# Patient Record
Sex: MALE | Race: WHITE | HISPANIC OR LATINO | Employment: UNEMPLOYED | URBAN - METROPOLITAN AREA
[De-identification: names, ages, dates, MRNs, and addresses within clinical notes are randomized per-mention and may not be internally consistent; named-entity substitution may affect disease eponyms.]

---

## 2019-07-15 PROBLEM — Z86.0100 HISTORY OF COLON POLYPS: Status: ACTIVE | Noted: 2019-07-15

## 2019-07-15 PROBLEM — Z86.010 HISTORY OF COLON POLYPS: Status: ACTIVE | Noted: 2019-07-15

## 2024-01-21 ENCOUNTER — APPOINTMENT (OUTPATIENT)
Dept: GENERAL RADIOLOGY | Facility: HOSPITAL | Age: 59
End: 2024-01-21
Payer: COMMERCIAL

## 2024-01-21 ENCOUNTER — HOSPITAL ENCOUNTER (EMERGENCY)
Facility: HOSPITAL | Age: 59
Discharge: HOME | End: 2024-01-21
Attending: EMERGENCY MEDICINE | Admitting: EMERGENCY MEDICINE
Payer: COMMERCIAL

## 2024-01-21 VITALS
DIASTOLIC BLOOD PRESSURE: 109 MMHG | TEMPERATURE: 98.5 F | SYSTOLIC BLOOD PRESSURE: 144 MMHG | RESPIRATION RATE: 16 BRPM | OXYGEN SATURATION: 99 % | HEART RATE: 82 BPM

## 2024-01-21 DIAGNOSIS — M77.12 LEFT LATERAL EPICONDYLITIS: ICD-10-CM

## 2024-01-21 DIAGNOSIS — M77.11 RIGHT LATERAL EPICONDYLITIS: Primary | ICD-10-CM

## 2024-01-21 LAB
ALBUMIN SERPL-MCNC: 3.5 G/DL (ref 3.5–5.2)
ALBUMIN/GLOB SERPL: 1.1 G/DL
ALP SERPL-CCNC: 94 U/L (ref 39–117)
ALT SERPL W P-5'-P-CCNC: 36 U/L (ref 1–41)
ANION GAP SERPL CALCULATED.3IONS-SCNC: 10.2 MMOL/L (ref 5–15)
AST SERPL-CCNC: 24 U/L (ref 1–40)
BASOPHILS # BLD AUTO: 0.02 10*3/MM3 (ref 0–0.2)
BASOPHILS NFR BLD AUTO: 0.3 % (ref 0–1.5)
BILIRUB SERPL-MCNC: 0.4 MG/DL (ref 0–1.2)
BUN SERPL-MCNC: 28 MG/DL (ref 6–20)
BUN/CREAT SERPL: 14.7 (ref 7–25)
CALCIUM SPEC-SCNC: 9 MG/DL (ref 8.6–10.5)
CHLORIDE SERPL-SCNC: 101 MMOL/L (ref 98–107)
CO2 SERPL-SCNC: 23.8 MMOL/L (ref 22–29)
CREAT SERPL-MCNC: 1.91 MG/DL (ref 0.76–1.27)
DEPRECATED RDW RBC AUTO: 41.1 FL (ref 37–54)
EGFRCR SERPLBLD CKD-EPI 2021: 39.9 ML/MIN/1.73
EOSINOPHIL # BLD AUTO: 0.06 10*3/MM3 (ref 0–0.4)
EOSINOPHIL NFR BLD AUTO: 0.8 % (ref 0.3–6.2)
ERYTHROCYTE [DISTWIDTH] IN BLOOD BY AUTOMATED COUNT: 13.3 % (ref 12.3–15.4)
GLOBULIN UR ELPH-MCNC: 3.3 GM/DL
GLUCOSE SERPL-MCNC: 108 MG/DL (ref 65–99)
HCT VFR BLD AUTO: 38 % (ref 37.5–51)
HGB BLD-MCNC: 12.5 G/DL (ref 13–17.7)
IMM GRANULOCYTES # BLD AUTO: 0.02 10*3/MM3 (ref 0–0.05)
IMM GRANULOCYTES NFR BLD AUTO: 0.3 % (ref 0–0.5)
LYMPHOCYTES # BLD AUTO: 1.6 10*3/MM3 (ref 0.7–3.1)
LYMPHOCYTES NFR BLD AUTO: 22.7 % (ref 19.6–45.3)
MCH RBC QN AUTO: 28.2 PG (ref 26.6–33)
MCHC RBC AUTO-ENTMCNC: 32.9 G/DL (ref 31.5–35.7)
MCV RBC AUTO: 85.6 FL (ref 79–97)
MONOCYTES # BLD AUTO: 0.58 10*3/MM3 (ref 0.1–0.9)
MONOCYTES NFR BLD AUTO: 8.2 % (ref 5–12)
NEUTROPHILS NFR BLD AUTO: 4.78 10*3/MM3 (ref 1.7–7)
NEUTROPHILS NFR BLD AUTO: 67.7 % (ref 42.7–76)
NRBC BLD AUTO-RTO: 0 /100 WBC (ref 0–0.2)
PLATELET # BLD AUTO: 163 10*3/MM3 (ref 140–450)
PMV BLD AUTO: 10.5 FL (ref 6–12)
POTASSIUM SERPL-SCNC: 4.2 MMOL/L (ref 3.5–5.2)
PROT SERPL-MCNC: 6.8 G/DL (ref 6–8.5)
RBC # BLD AUTO: 4.44 10*6/MM3 (ref 4.14–5.8)
SODIUM SERPL-SCNC: 135 MMOL/L (ref 136–145)
WBC NRBC COR # BLD AUTO: 7.06 10*3/MM3 (ref 3.4–10.8)

## 2024-01-21 PROCEDURE — 36415 COLL VENOUS BLD VENIPUNCTURE: CPT

## 2024-01-21 PROCEDURE — 85025 COMPLETE CBC W/AUTO DIFF WBC: CPT | Performed by: EMERGENCY MEDICINE

## 2024-01-21 PROCEDURE — 80053 COMPREHEN METABOLIC PANEL: CPT | Performed by: EMERGENCY MEDICINE

## 2024-01-21 PROCEDURE — 73090 X-RAY EXAM OF FOREARM: CPT

## 2024-01-21 PROCEDURE — 73070 X-RAY EXAM OF ELBOW: CPT

## 2024-01-21 PROCEDURE — 99283 EMERGENCY DEPT VISIT LOW MDM: CPT

## 2024-01-21 RX ORDER — PREDNISONE 20 MG/1
40 TABLET ORAL DAILY
Qty: 10 TABLET | Refills: 0 | Status: SHIPPED | OUTPATIENT
Start: 2024-01-21

## 2024-01-21 RX ORDER — PREDNISONE 20 MG/1
40 TABLET ORAL DAILY
Qty: 10 TABLET | Refills: 0 | Status: SHIPPED | OUTPATIENT
Start: 2024-01-21 | End: 2024-01-21 | Stop reason: SDUPTHER

## 2024-01-21 RX ORDER — HYDROCODONE BITARTRATE AND ACETAMINOPHEN 5; 325 MG/1; MG/1
1 TABLET ORAL EVERY 6 HOURS PRN
Qty: 5 TABLET | Refills: 0 | Status: SHIPPED | OUTPATIENT
Start: 2024-01-21

## 2024-01-21 NOTE — ED PROVIDER NOTES
EMERGENCY DEPARTMENT ENCOUNTER    Room Number:  10/10  PCP: Eric Eaton MD  Historian: Patient      HPI:  Chief Complaint: Right elbow pain  A complete HPI/ROS/PMH/PSH/SH/FH are unobtainable due to: Language barrier history obtained through phone   Context: Ankit Shore is a 59 y.o. male who presents to the ED c/o elbow pain.  Patient has history of gout as well as kidney issues.  Patient is a  for living.  Patient states he had increasing pain in his right elbow.  Pain worse with movement.  Worse at night and better during the day.  Point tender to his lateral elbow.  Patient states he is starting to get some pain in the left elbow as well.  Has had no fevers or chills.  Patient has no pain in his neck            PAST MEDICAL HISTORY  Active Ambulatory Problems     Diagnosis Date Noted    No Active Ambulatory Problems     Resolved Ambulatory Problems     Diagnosis Date Noted    No Resolved Ambulatory Problems     No Additional Past Medical History         PAST SURGICAL HISTORY  No past surgical history on file.      FAMILY HISTORY  No family history on file.      SOCIAL HISTORY  Social History     Socioeconomic History    Marital status:          ALLERGIES  Patient has no known allergies.        REVIEW OF SYSTEMS  Review of Systems   Right elbow pain      PHYSICAL EXAM  ED Triage Vitals   Temp Heart Rate Resp BP SpO2   01/21/24 1008 01/21/24 1008 01/21/24 1008 01/21/24 1018 01/21/24 1008   98.5 °F (36.9 °C) 82 16 149/86 99 %      Temp src Heart Rate Source Patient Position BP Location FiO2 (%)   01/21/24 1008 01/21/24 1008 -- -- --   Tympanic Monitor          Physical Exam      GENERAL: no acute distress  HENT: nares patent  EYES: no scleral icterus  CV: regular rhythm, normal rate  RESPIRATORY: normal effort  ABDOMEN: soft  MUSCULOSKELETAL: no deformity.  Tenderness to lateral epicondyle both sides  NEURO: alert, moves all extremities, follows commands  PSYCH:  calm,  cooperative  SKIN: warm, dry    Vital signs and nursing notes reviewed.          LAB RESULTS  Recent Results (from the past 24 hour(s))   Comprehensive Metabolic Panel    Collection Time: 01/21/24 10:47 AM    Specimen: Arm, Left; Blood   Result Value Ref Range    Glucose 108 (H) 65 - 99 mg/dL    BUN 28 (H) 6 - 20 mg/dL    Creatinine 1.91 (H) 0.76 - 1.27 mg/dL    Sodium 135 (L) 136 - 145 mmol/L    Potassium 4.2 3.5 - 5.2 mmol/L    Chloride 101 98 - 107 mmol/L    CO2 23.8 22.0 - 29.0 mmol/L    Calcium 9.0 8.6 - 10.5 mg/dL    Total Protein 6.8 6.0 - 8.5 g/dL    Albumin 3.5 3.5 - 5.2 g/dL    ALT (SGPT) 36 1 - 41 U/L    AST (SGOT) 24 1 - 40 U/L    Alkaline Phosphatase 94 39 - 117 U/L    Total Bilirubin 0.4 0.0 - 1.2 mg/dL    Globulin 3.3 gm/dL    A/G Ratio 1.1 g/dL    BUN/Creatinine Ratio 14.7 7.0 - 25.0    Anion Gap 10.2 5.0 - 15.0 mmol/L    eGFR 39.9 (L) >60.0 mL/min/1.73   CBC Auto Differential    Collection Time: 01/21/24 10:47 AM    Specimen: Arm, Left; Blood   Result Value Ref Range    WBC 7.06 3.40 - 10.80 10*3/mm3    RBC 4.44 4.14 - 5.80 10*6/mm3    Hemoglobin 12.5 (L) 13.0 - 17.7 g/dL    Hematocrit 38.0 37.5 - 51.0 %    MCV 85.6 79.0 - 97.0 fL    MCH 28.2 26.6 - 33.0 pg    MCHC 32.9 31.5 - 35.7 g/dL    RDW 13.3 12.3 - 15.4 %    RDW-SD 41.1 37.0 - 54.0 fl    MPV 10.5 6.0 - 12.0 fL    Platelets 163 140 - 450 10*3/mm3    Neutrophil % 67.7 42.7 - 76.0 %    Lymphocyte % 22.7 19.6 - 45.3 %    Monocyte % 8.2 5.0 - 12.0 %    Eosinophil % 0.8 0.3 - 6.2 %    Basophil % 0.3 0.0 - 1.5 %    Immature Grans % 0.3 0.0 - 0.5 %    Neutrophils, Absolute 4.78 1.70 - 7.00 10*3/mm3    Lymphocytes, Absolute 1.60 0.70 - 3.10 10*3/mm3    Monocytes, Absolute 0.58 0.10 - 0.90 10*3/mm3    Eosinophils, Absolute 0.06 0.00 - 0.40 10*3/mm3    Basophils, Absolute 0.02 0.00 - 0.20 10*3/mm3    Immature Grans, Absolute 0.02 0.00 - 0.05 10*3/mm3    nRBC 0.0 0.0 - 0.2 /100 WBC       Ordered the above labs and reviewed the  results.        RADIOLOGY  XR Elbow 2 View Right, XR Forearm 2 View Right    Result Date: 1/21/2024  RIGHT FOREARM, RIGHT ELBOW  HISTORY: Arm pain. No injury.  TWO VIEWS OF THE RIGHT FOREARM AND TWO VIEWS OF THE RIGHT ELBOW: There is no evidence for fracture or osseous abnormality of the right forearm or right elbow. There is a tiny olecranon spur. There is no finding to suggest elbow joint effusion.      No evidence for acute abnormality of the right forearm or right elbow.  This report was finalized on 1/21/2024 10:59 AM by Dr. Yousuf Cruz M.D on Workstation: [email or web address]       Ordered the above noted radiological studies.  X-ray independently interpreted by me and shows no evidence of fracture          PROCEDURES  Procedures            MEDICATIONS GIVEN IN ER  Medications - No data to display                MEDICAL DECISION MAKING, PROGRESS, and CONSULTS     Discussion below represents my analysis of pertinent findings related to patient's condition, differential diagnosis, treatment plan and final disposition.      Additional sources:  - Discussed/ obtained information from independent historians: None    - External (non-ED) record review: Epic reviewed and patient has no prior records in epic    - Chronic or social conditions impacting care: None    - Shared decision making: None      Orders placed during this visit:  Orders Placed This Encounter   Procedures    XR Elbow 2 View Right    XR Forearm 2 View Right    Comprehensive Metabolic Panel    CBC Auto Differential    CBC & Differential         Additional orders considered but not ordered:  None        Differential diagnosis includes but is not limited to:    Lateral epicondylitis versus septic arthritis versus cervical radiculopathy      Independent interpretation of labs, radiology studies, and discussions with consultants:  ED Course as of 01/21/24 1210   Sun Jan 21, 2024   1133 11:33 EST  Patient with bilateral elbow pain right greater than left.   Patient appears to be getting lateral epicondylitis.  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  Patient is point tender on both elbows right worse than left.  Patient does have history of kidney problems.  Discussed through  and patient does have a kidney doctor he follows up with.  We will try steroids for his elbow pain.  He is going to take off work for the next few days.  He is going to follow-up with a hand surgeon and return here for any concerns. [SL]      ED Course User Index  [SL] Mike Benedict MD               DIAGNOSIS  Final diagnoses:   Right lateral epicondylitis   Left lateral epicondylitis         DISPOSITION  DISCHARGE    Patient discharged in stable condition.    Reviewed implications of results, diagnosis, meds, responsibility to follow up, warning signs and symptoms of possible worsening, potential complications and reasons to return to ER, including worsening symptoms    Patient/Family voiced understanding of above instructions.    Discussed plan for discharge, as there is no emergent indication for admission. Patient referred to primary care provider for BP management due to today's BP. Pt/family is agreeable and understands need for follow up and repeat testing.  Pt is aware that discharge does not mean that nothing is wrong but it indicates no emergency is present that requires admission and they must continue care with follow-up as given below or physician of their choice.     FOLLOW-UP  Eric Eaton MD  [address] [facility] [address] [phone]    Schedule an appointment as soon as possible for a visit       Roma Aragon MD  [address] [facility] [address] [phone]    Schedule an appointment as soon as possible for a visit            Medication List        New Prescriptions      HYDROcodone-acetaminophen 5-325 MG per tablet  Commonly known as: NORCO  Take 1 tablet by mouth Every 6 (Six) Hours As Needed for Moderate Pain.      predniSONE 20 MG tablet  Commonly known as: DELTASONE  Take 2 tablets by mouth Daily.               Where to Get Your Medications        These medications were sent to [id number] - [place], [place] - [address] AT [facility] [address] [facility] - [phone]  - [phone]  [address], [facility] [address]      Phone: [phone]   HYDROcodone-acetaminophen 5-325 MG per tablet  predniSONE 20 MG tablet                  Latest Documented Vital Signs:  As of 12:10 EST  BP- (!) 144/109 HR- 82 Temp- 98.5 °F (36.9 °C) (Tympanic) O2 sat- 99%              --    Please note that portions of this were completed with a voice recognition program.       Note Disclaimer: At [facility], we believe that sharing information builds trust and better relationships. You are receiving this note because you are receiving care at [facility] or recently visited. It is possible you will see health information before a provider has talked with you about it. This kind of information can be easy to misunderstand. To help you fully understand what it means for your health, we urge you to discuss this note with your provider.            Mike Benedict MD  01/21/24 8314

## 2024-01-21 NOTE — ED TRIAGE NOTES
"Pt c/o RUE pain x2 days, extending from elbow to hands, states he has difficulty using his right hand, states \"I can't do basic things like open a bottle\", denies known injury. Reports left hand pain as well but states \"it's not as bad as my right hand\", pt denies neck pain. No obvious deformities noted to RUE.       "

## 2024-01-21 NOTE — PROGRESS NOTES
Pharmacy Discharge Medication Update:    Pharmacy name: [facility]    Issue: Patient's family member called asking for prednisone prescription to be sent to [facility] since [facility] was not open today.  I verified the [facility] the family wanted as [facility] [address] [facility]  I rerouted the prednisone prescription to the [facility] of choice and called [facility] and left a voicemail to cancel the prednisone at that location.  I informed the family member that they would have get the controlled substance prescription from the [facility] it was originally sent too.      Laura Cabello, Pharm.D., BCPS  1/21/2024  12:37 EST

## 2024-01-31 ENCOUNTER — TELEPHONE (OUTPATIENT)
Dept: CARDIOLOGY | Facility: CLINIC | Age: 59
End: 2024-01-31
Payer: COMMERCIAL

## 2024-08-28 ENCOUNTER — TELEPHONE (OUTPATIENT)
Dept: SURGERY | Facility: CLINIC | Age: 59
End: 2024-08-28
Payer: COMMERCIAL

## 2024-08-28 NOTE — TELEPHONE ENCOUNTER
Patient daughter called to request colonoscopy packet; patient received 5 year recall letter. Mailed packet.

## 2024-10-08 ENCOUNTER — PREP FOR SURGERY (OUTPATIENT)
Dept: OTHER | Facility: HOSPITAL | Age: 59
End: 2024-10-08
Payer: COMMERCIAL

## 2024-10-08 DIAGNOSIS — Z86.0101 HISTORY OF ADENOMATOUS POLYP OF COLON: Primary | ICD-10-CM

## 2025-04-14 ENCOUNTER — HOSPITAL ENCOUNTER (EMERGENCY)
Facility: HOSPITAL | Age: 60
Discharge: HOME | End: 2025-04-14
Attending: STUDENT IN AN ORGANIZED HEALTH CARE EDUCATION/TRAINING PROGRAM | Admitting: STUDENT IN AN ORGANIZED HEALTH CARE EDUCATION/TRAINING PROGRAM
Payer: COMMERCIAL

## 2025-04-14 ENCOUNTER — APPOINTMENT (OUTPATIENT)
Dept: GENERAL RADIOLOGY | Facility: HOSPITAL | Age: 60
End: 2025-04-14
Payer: COMMERCIAL

## 2025-04-14 VITALS
TEMPERATURE: 97.7 F | OXYGEN SATURATION: 97 % | DIASTOLIC BLOOD PRESSURE: 75 MMHG | SYSTOLIC BLOOD PRESSURE: 127 MMHG | RESPIRATION RATE: 16 BRPM | HEART RATE: 67 BPM

## 2025-04-14 DIAGNOSIS — M10.061 ACUTE IDIOPATHIC GOUT OF RIGHT KNEE: Primary | ICD-10-CM

## 2025-04-14 DIAGNOSIS — M77.11 RIGHT LATERAL EPICONDYLITIS: ICD-10-CM

## 2025-04-14 LAB
ALBUMIN SERPL-MCNC: 3.7 G/DL (ref 3.5–5.2)
ALBUMIN/GLOB SERPL: 1 G/DL
ALP SERPL-CCNC: 409 U/L (ref 39–117)
ALT SERPL W P-5'-P-CCNC: 111 U/L (ref 1–41)
ANION GAP SERPL CALCULATED.3IONS-SCNC: 10.6 MMOL/L (ref 5–15)
APPEARANCE FLD: ABNORMAL
AST SERPL-CCNC: 70 U/L (ref 1–40)
BASOPHILS # BLD AUTO: 0.04 10*3/MM3 (ref 0–0.2)
BASOPHILS NFR BLD AUTO: 0.5 % (ref 0–1.5)
BILIRUB SERPL-MCNC: 0.5 MG/DL (ref 0–1.2)
BUN SERPL-MCNC: 39 MG/DL (ref 8–23)
BUN/CREAT SERPL: 18.5 (ref 7–25)
CALCIUM SPEC-SCNC: 9.1 MG/DL (ref 8.6–10.5)
CHLORIDE SERPL-SCNC: 100 MMOL/L (ref 98–107)
CO2 SERPL-SCNC: 20.4 MMOL/L (ref 22–29)
COLOR FLD: YELLOW
CREAT SERPL-MCNC: 2.11 MG/DL (ref 0.76–1.27)
CRYSTALS FLD MICRO: NORMAL
DEPRECATED RDW RBC AUTO: 44.5 FL (ref 37–54)
EGFRCR SERPLBLD CKD-EPI 2021: 35.2 ML/MIN/1.73
EOSINOPHIL # BLD AUTO: 0.07 10*3/MM3 (ref 0–0.4)
EOSINOPHIL NFR BLD AUTO: 0.9 % (ref 0.3–6.2)
ERYTHROCYTE [DISTWIDTH] IN BLOOD BY AUTOMATED COUNT: 14.5 % (ref 12.3–15.4)
GLOBULIN UR ELPH-MCNC: 3.7 GM/DL
GLUCOSE SERPL-MCNC: 138 MG/DL (ref 65–99)
HCT VFR BLD AUTO: 36.8 % (ref 37.5–51)
HGB BLD-MCNC: 12.1 G/DL (ref 13–17.7)
IMM GRANULOCYTES # BLD AUTO: 0.03 10*3/MM3 (ref 0–0.05)
IMM GRANULOCYTES NFR BLD AUTO: 0.4 % (ref 0–0.5)
LYMPHOCYTES # BLD AUTO: 1.53 10*3/MM3 (ref 0.7–3.1)
LYMPHOCYTES NFR BLD AUTO: 18.8 % (ref 19.6–45.3)
LYMPHOCYTES NFR FLD MANUAL: 6 %
MCH RBC QN AUTO: 28.1 PG (ref 26.6–33)
MCHC RBC AUTO-ENTMCNC: 32.9 G/DL (ref 31.5–35.7)
MCV RBC AUTO: 85.6 FL (ref 79–97)
METHOD: ABNORMAL
MONOCYTES # BLD AUTO: 0.44 10*3/MM3 (ref 0.1–0.9)
MONOCYTES NFR BLD AUTO: 5.4 % (ref 5–12)
MONOCYTES NFR FLD: 15 %
NEUTROPHILS NFR BLD AUTO: 6.02 10*3/MM3 (ref 1.7–7)
NEUTROPHILS NFR BLD AUTO: 74 % (ref 42.7–76)
NEUTROPHILS NFR FLD MANUAL: 79 %
NRBC BLD AUTO-RTO: 0 /100 WBC (ref 0–0.2)
NUC CELL # FLD: ABNORMAL /MM3
PLATELET # BLD AUTO: 217 10*3/MM3 (ref 140–450)
PMV BLD AUTO: 10.2 FL (ref 6–12)
POTASSIUM SERPL-SCNC: 3.9 MMOL/L (ref 3.5–5.2)
PROT SERPL-MCNC: 7.4 G/DL (ref 6–8.5)
RBC # BLD AUTO: 4.3 10*6/MM3 (ref 4.14–5.8)
RBC # FLD AUTO: 17 /MM3
SODIUM SERPL-SCNC: 131 MMOL/L (ref 136–145)
URATE SERPL-MCNC: 4.5 MG/DL (ref 3.4–7)
WBC NRBC COR # BLD AUTO: 8.13 10*3/MM3 (ref 3.4–10.8)

## 2025-04-14 PROCEDURE — 96374 THER/PROPH/DIAG INJ IV PUSH: CPT

## 2025-04-14 PROCEDURE — 96375 TX/PRO/DX INJ NEW DRUG ADDON: CPT

## 2025-04-14 PROCEDURE — 25010000002 MORPHINE PER 10 MG: Performed by: STUDENT IN AN ORGANIZED HEALTH CARE EDUCATION/TRAINING PROGRAM

## 2025-04-14 PROCEDURE — 85025 COMPLETE CBC W/AUTO DIFF WBC: CPT | Performed by: STUDENT IN AN ORGANIZED HEALTH CARE EDUCATION/TRAINING PROGRAM

## 2025-04-14 PROCEDURE — 87205 SMEAR GRAM STAIN: CPT | Performed by: STUDENT IN AN ORGANIZED HEALTH CARE EDUCATION/TRAINING PROGRAM

## 2025-04-14 PROCEDURE — 89051 BODY FLUID CELL COUNT: CPT | Performed by: STUDENT IN AN ORGANIZED HEALTH CARE EDUCATION/TRAINING PROGRAM

## 2025-04-14 PROCEDURE — 84550 ASSAY OF BLOOD/URIC ACID: CPT | Performed by: STUDENT IN AN ORGANIZED HEALTH CARE EDUCATION/TRAINING PROGRAM

## 2025-04-14 PROCEDURE — 87015 SPECIMEN INFECT AGNT CONCNTJ: CPT | Performed by: STUDENT IN AN ORGANIZED HEALTH CARE EDUCATION/TRAINING PROGRAM

## 2025-04-14 PROCEDURE — 87070 CULTURE OTHR SPECIMN AEROBIC: CPT | Performed by: STUDENT IN AN ORGANIZED HEALTH CARE EDUCATION/TRAINING PROGRAM

## 2025-04-14 PROCEDURE — 25010000002 DEXAMETHASONE PER 1 MG: Performed by: STUDENT IN AN ORGANIZED HEALTH CARE EDUCATION/TRAINING PROGRAM

## 2025-04-14 PROCEDURE — 25010000002 ONDANSETRON PER 1 MG: Performed by: STUDENT IN AN ORGANIZED HEALTH CARE EDUCATION/TRAINING PROGRAM

## 2025-04-14 PROCEDURE — 25010000002 LIDOCAINE 1 % SOLUTION: Performed by: STUDENT IN AN ORGANIZED HEALTH CARE EDUCATION/TRAINING PROGRAM

## 2025-04-14 PROCEDURE — 80053 COMPREHEN METABOLIC PANEL: CPT | Performed by: STUDENT IN AN ORGANIZED HEALTH CARE EDUCATION/TRAINING PROGRAM

## 2025-04-14 PROCEDURE — 89060 EXAM SYNOVIAL FLUID CRYSTALS: CPT | Performed by: STUDENT IN AN ORGANIZED HEALTH CARE EDUCATION/TRAINING PROGRAM

## 2025-04-14 PROCEDURE — 99283 EMERGENCY DEPT VISIT LOW MDM: CPT

## 2025-04-14 PROCEDURE — 73562 X-RAY EXAM OF KNEE 3: CPT

## 2025-04-14 RX ORDER — PREDNISONE 20 MG/1
40 TABLET ORAL DAILY
Qty: 14 TABLET | Refills: 0 | Status: SHIPPED | OUTPATIENT
Start: 2025-04-14 | End: 2025-04-21

## 2025-04-14 RX ORDER — ONDANSETRON 2 MG/ML
4 INJECTION INTRAMUSCULAR; INTRAVENOUS ONCE
Status: COMPLETED | OUTPATIENT
Start: 2025-04-14 | End: 2025-04-14

## 2025-04-14 RX ORDER — LIDOCAINE HYDROCHLORIDE 10 MG/ML
10 INJECTION, SOLUTION INFILTRATION; PERINEURAL ONCE
Status: COMPLETED | OUTPATIENT
Start: 2025-04-14 | End: 2025-04-14

## 2025-04-14 RX ORDER — HYDROCODONE BITARTRATE AND ACETAMINOPHEN 5; 325 MG/1; MG/1
1 TABLET ORAL EVERY 6 HOURS PRN
Qty: 12 TABLET | Refills: 0 | Status: SHIPPED | OUTPATIENT
Start: 2025-04-14

## 2025-04-14 RX ORDER — MORPHINE SULFATE 2 MG/ML
4 INJECTION, SOLUTION INTRAMUSCULAR; INTRAVENOUS ONCE
Status: COMPLETED | OUTPATIENT
Start: 2025-04-14 | End: 2025-04-14

## 2025-04-14 RX ORDER — DEXAMETHASONE SODIUM PHOSPHATE 10 MG/ML
10 INJECTION, SOLUTION INTRA-ARTICULAR; INTRALESIONAL; INTRAMUSCULAR; INTRAVENOUS; SOFT TISSUE ONCE
Status: COMPLETED | OUTPATIENT
Start: 2025-04-14 | End: 2025-04-14

## 2025-04-14 RX ADMIN — LIDOCAINE HYDROCHLORIDE 10 ML: 10 INJECTION, SOLUTION INFILTRATION; PERINEURAL at 10:33

## 2025-04-14 RX ADMIN — ONDANSETRON 4 MG: 2 INJECTION, SOLUTION INTRAMUSCULAR; INTRAVENOUS at 10:29

## 2025-04-14 RX ADMIN — MORPHINE SULFATE 4 MG: 2 INJECTION, SOLUTION INTRAMUSCULAR; INTRAVENOUS at 10:30

## 2025-04-14 RX ADMIN — DEXAMETHASONE SODIUM PHOSPHATE 10 MG: 10 INJECTION INTRAMUSCULAR; INTRAVENOUS at 10:32

## 2025-04-14 NOTE — ED PROVIDER NOTES
EMERGENCY DEPARTMENT ENCOUNTER  Room Number:  31/31  PCP: Eric Eaton MD  Independent Historians: Patient and Family      HPI:  Chief Complaint: had concerns including Knee Pain.     A complete HPI/ROS/PMH/PSH/SH/FH are unobtainable due to: None    Chronic or social conditions impacting patient care (Social Determinants of Health): None      Context: Ankit Shore is a 60 y.o. male with a medical history of CKD, hypertension, hyperparathyroidism, gout, who presents to the ED c/o acute right knee pain.  He was recently in [place] where he had a CT of his abdomen and diagnosed with diverticulitis.  He is currently taking Augmentin and Norco for this.  In the past several days patient has had worsening pain to the inside of his right knee.  He is not able to walk well due to the pain.  He does have a history of gout on allopurinol.      Review of prior external notes (non-ED) -and- Review of prior external test results outside of this encounter:  I reviewed nephrology note from 1 month ago.  Creatinine 1.73.  Last A1c 5.9%.    Prescription drug monitoring program review: [facility] reviewed by Paulo Gonsalez MD       PAST MEDICAL HISTORY  Active Ambulatory Problems     Diagnosis Date Noted    DAMIAN (obstructive sleep apnea)     History of colon polyps 07/15/2019     Resolved Ambulatory Problems     Diagnosis Date Noted    No Resolved Ambulatory Problems     Past Medical History:   Diagnosis Date    Anemia     Hypertension     Renal insufficiency          PAST SURGICAL HISTORY  Past Surgical History:   Procedure Laterality Date    APPENDECTOMY  1997    COLONOSCOPY N/A 4/14/2016    Procedure: COLONOSCOPY to cecum with cold biopsy polypectomy and hot snare polypectomy;  Surgeon: Lenard Anderson MD;  Location: [facility] ENDOSCOPY;  Service:     COLONOSCOPY N/A 8/29/2019    Procedure: COLONOSCOPY WITH POLYPECTOMY (COLD BX);  Surgeon: Lenard Anderson MD;  Location: [facility] ENDOSCOPY;  Service:  General    ENDOSCOPY N/A 4/14/2016    Procedure: ESOPHAGOGASTRODUODENOSCOPY with biopsy;  Surgeon: Lenard Anderson MD;  Location: [facility] ENDOSCOPY;  Service:          FAMILY HISTORY  Family History   Problem Relation Age of Onset    Cancer Mother         Uterine    Thyroid disease Father          SOCIAL HISTORY  Social History     Socioeconomic History    Marital status:    Tobacco Use    Smoking status: Never   Substance and Sexual Activity    Alcohol use: No    Drug use: No    Sexual activity: Defer       ALLERGIES  Patient has no known allergies.      PHYSICAL EXAM    I have reviewed the triage vital signs and nursing notes.    ED Triage Vitals   Temp Heart Rate Resp BP SpO2   03/02/25 1448 03/02/25 1448 03/02/25 1448 03/02/25 1450 03/02/25 1448   97.4 °F (36.3 °C) 95 16 141/84 97 %      Temp src Heart Rate Source Patient Position BP Location FiO2 (%)   -- -- -- -- --              Physical Exam  GENERAL: alert, no acute distress  SKIN: Warm, dry, no overlying skin changes to right knee  HENT: Normocephalic, atraumatic  EYES: no scleral icterus  CV: regular rhythm, regular rate  RESPIRATORY: normal effort, lungs clear  ABDOMEN: soft, nondistended, nontender  MUSCULOSKELETAL: no deformity, mild swelling of right knee, mild-moderate discomfort with passive range of motion  NEURO: alert, moves all extremities, follows commands        LAB RESULTS  Recent Results (from the past 24 hours)   Comprehensive Metabolic Panel    Collection Time: 04/14/25  8:55 AM    Specimen: Blood   Result Value Ref Range    Glucose 138 (H) 65 - 99 mg/dL    BUN 39 (H) 8 - 23 mg/dL    Creatinine 2.11 (H) 0.76 - 1.27 mg/dL    Sodium 131 (L) 136 - 145 mmol/L    Potassium 3.9 3.5 - 5.2 mmol/L    Chloride 100 98 - 107 mmol/L    CO2 20.4 (L) 22.0 - 29.0 mmol/L    Calcium 9.1 8.6 - 10.5 mg/dL    Total Protein 7.4 6.0 - 8.5 g/dL    Albumin 3.7 3.5 - 5.2 g/dL    ALT (SGPT) 111 (H) 1 - 41 U/L    AST (SGOT) 70 (H) 1 - 40 U/L    Alkaline  Phosphatase 409 (H) 39 - 117 U/L    Total Bilirubin 0.5 0.0 - 1.2 mg/dL    Globulin 3.7 gm/dL    A/G Ratio 1.0 g/dL    BUN/Creatinine Ratio 18.5 7.0 - 25.0    Anion Gap 10.6 5.0 - 15.0 mmol/L    eGFR 35.2 (L) >60.0 mL/min/1.73   Uric Acid    Collection Time: 04/14/25  8:55 AM    Specimen: Blood   Result Value Ref Range    Uric Acid 4.5 3.4 - 7.0 mg/dL   CBC Auto Differential    Collection Time: 04/14/25  8:55 AM    Specimen: Blood   Result Value Ref Range    WBC 8.13 3.40 - 10.80 10*3/mm3    RBC 4.30 4.14 - 5.80 10*6/mm3    Hemoglobin 12.1 (L) 13.0 - 17.7 g/dL    Hematocrit 36.8 (L) 37.5 - 51.0 %    MCV 85.6 79.0 - 97.0 fL    MCH 28.1 26.6 - 33.0 pg    MCHC 32.9 31.5 - 35.7 g/dL    RDW 14.5 12.3 - 15.4 %    RDW-SD 44.5 37.0 - 54.0 fl    MPV 10.2 6.0 - 12.0 fL    Platelets 217 140 - 450 10*3/mm3    Neutrophil % 74.0 42.7 - 76.0 %    Lymphocyte % 18.8 (L) 19.6 - 45.3 %    Monocyte % 5.4 5.0 - 12.0 %    Eosinophil % 0.9 0.3 - 6.2 %    Basophil % 0.5 0.0 - 1.5 %    Immature Grans % 0.4 0.0 - 0.5 %    Neutrophils, Absolute 6.02 1.70 - 7.00 10*3/mm3    Lymphocytes, Absolute 1.53 0.70 - 3.10 10*3/mm3    Monocytes, Absolute 0.44 0.10 - 0.90 10*3/mm3    Eosinophils, Absolute 0.07 0.00 - 0.40 10*3/mm3    Basophils, Absolute 0.04 0.00 - 0.20 10*3/mm3    Immature Grans, Absolute 0.03 0.00 - 0.05 10*3/mm3    nRBC 0.0 0.0 - 0.2 /100 WBC   Crystal Exam, Fluid - Synovial Fluid, Knee, Right    Collection Time: 04/14/25 10:34 AM    Specimen: Knee, Right; Synovial Fluid   Result Value Ref Range    Crystals, Fluid       Intra and Extracellular crystals observed exhibiting polarization characteristics of Uric Acid   Body fluid cell count - Synovial Fluid, Knee, Right    Collection Time: 04/14/25 10:34 AM    Specimen: Knee, Right; Synovial Fluid   Result Value Ref Range    Color, Fluid Yellow     Appearance, Fluid Cloudy (A) Clear    RBC, Fluid 17 /mm3    Nucleated Cells, Fluid 13,550 /mm3    Method: Hemacytometer Method    Body fluid  differential - Synovial Fluid, Knee, Right    Collection Time: 04/14/25 10:34 AM    Specimen: Knee, Right; Synovial Fluid   Result Value Ref Range    Neutrophils, Fluid % 79 %    Lymphocytes, Fluid % 6 %    Monocytes, Fluid % 15 %       RADIOLOGY  XR Knee 3 View Right  Result Date: 4/14/2025  XR KNEE 3 VW RIGHT-  Clinical: Medial knee pain and swelling with a history of gout  FINDINGS: Medial and lateral compartments preserved. Patellofemoral articulation within normal limits. No acute osseous abnormality. Possible suprapatellar effusion. The remainder is unremarkable.    This report was finalized on 4/14/2025 9:39 AM by Dr. Rigo Nielsen M.D on Workstation: [id number]          MEDICATIONS GIVEN IN ER  Medications   lidocaine (XYLOCAINE) 1 % injection 10 mL (10 mL Injection Given by Other 4/14/25 1033)   morphine injection 4 mg (4 mg Intravenous Given 4/14/25 1030)   dexAMETHasone (DECADRON) injection 10 mg (10 mg Intravenous Given 4/14/25 1032)   ondansetron (ZOFRAN) injection 4 mg (4 mg Intravenous Given 4/14/25 1029)         ORDERS PLACED DURING THIS VISIT:  Orders Placed This Encounter   Procedures    Arthocentesis    Body Fluid Culture - Body Fluid, Knee, Right    XR Knee 3 View Right    Comprehensive Metabolic Panel    Uric Acid    CBC Auto Differential    Body Fluid Cell Count With Differential - Synovial Fluid, Knee, Right    Crystal Exam, Fluid - Synovial Fluid, Knee, Right    Body fluid cell count - Synovial Fluid, Knee, Right    Body fluid differential - Synovial Fluid, Knee, Right    CBC & Differential         OUTPATIENT MEDICATION MANAGEMENT:  No current Epic-ordered facility-administered medications on file.     Current Outpatient Medications Ordered in Epic   Medication Sig Dispense Refill    HYDROcodone-acetaminophen (NORCO) 5-325 MG per tablet Take 1 tablet by mouth Every 6 (Six) Hours As Needed for Moderate Pain. 12 tablet 0    allopurinol (ZYLOPRIM) 300 MG tablet       atorvastatin (LIPITOR) 10  MG tablet Take 1 tablet by mouth Daily.      carvedilol (COREG) 25 MG tablet       doxazosin (CARDURA) 8 MG tablet       Filspari 200 MG tablet Take 1 tablet by mouth Daily.      loratadine (CLARITIN) 10 MG tablet       melatonin 5 MG tablet tablet Take 1 tablet by mouth Daily.      naloxone (NARCAN) 4 MG/0.1ML nasal spray Call 911. Don't prime. [place] in 1 nostril for overdose. Repeat in 2-3 minutes in other nostril if no or minimal breathing/responsiveness. 2 each 0    predniSONE (DELTASONE) 20 MG tablet Take 2 tablets by mouth Daily for 7 days. 14 tablet 0    rosuvastatin (CRESTOR) 5 MG tablet Take 1 tablet by mouth.      traZODone (DESYREL) 100 MG tablet Take 1 tablet by mouth Daily.      valsartan (DIOVAN) 320 MG tablet Take 0.5 tablets by mouth.           PROCEDURES  Arthocentesis    Date/Time: 4/14/2025 10:00 AM    Performed by: Paulo Gonsalez MD  Authorized by: Paulo Gonsalez MD    Consent:     Consent obtained:  Verbal    Consent given by:  Patient    Risks, benefits, and alternatives were discussed: yes      Risks discussed:  Bleeding, infection, pain and incomplete drainage    Alternatives discussed:  No treatment, delayed treatment, alternative treatment, referral and observation  Universal protocol:     Patient identity confirmed:  Verbally with patient  Location:     Location:  Knee    Knee:  R knee  Anesthesia:     Anesthesia method:  Local infiltration    Local anesthetic:  Lidocaine 1% w/o epi  Procedure details:     Preparation: Patient was prepped and draped in usual sterile fashion      Needle gauge:  18 G    Ultrasound guidance: no      Approach: inferior lateral.    Aspirate amount:  10 cc    Aspirate characteristics:  Cloudy and yellow    Steroid injected: no    Post-procedure details:     Dressing:  Adhesive bandage    Procedure completion:  Tolerated well, no immediate complications  Comments:      Fluid sent for studies              PROGRESS, DATA ANALYSIS, CONSULTS, AND MEDICAL  DECISION MAKING  All labs have been independently interpreted by me.  All radiology studies have been reviewed by me. All EKG's have been independently viewed and interpreted by me.  Discussion below represents my analysis of pertinent findings related to patient's condition, differential diagnosis, treatment plan and final disposition.    Differential diagnosis includes but is not limited to gout or other crystal arthropathy, septic joint, sprain, fracture, effusion.    Clinical Scores:                                       ED Course as of 04/14/25 1237   Mon Apr 14, 2025   0835 Patient presents to the ED for evaluation of right knee pain.  He was recently in [place] where he had a CT of his abdomen and diagnosed with diverticulitis.  He is currently taking Augmentin and Norco for this.  In the past several days patient has had worsening pain to the inside of his right knee.  He is not able to walk well due to the pain.  He does have a history of gout on allopurinol.    On exam patient with moderate swelling to right knee.  No overlying skin changes or warmth.  Patient with mild discomfort with passive range of motion of right knee.    Will obtain basic labs, uric acid, right knee x-ray.  Low suspicion for septic joint at this time, patient has also been on approximately 1 week of antibiotics.  Patient and family are agreeable with plan [DN]   0906 WBC: 8.13 [DN]   0934 XR Knee 3 View Right  I reviewed patient's xray image(s), small effusion, interpreted by self  I reviewed the radiologist's interpretation of above image(s)   [DN]   0935 Uric Acid: 4.5 [DN]   0938 Creatinine(!): 2.11  Slightly increased from most recent outpatient creatinine of 1.73 on labs 1 month ago [DN]   0938 ALT (SGPT)(!): 111 [DN]   0938 AST (SGOT)(!): 70 [DN]   0938 Alkaline Phosphatase(!): 409  Elevated from baseline.  Unclear etiology [DN]   0938 Total Bilirubin: 0.5 [DN]   0958 As results with patient.  Normal uric acid level.  Low  suspicion but possible septic joint.  Patient does desire arthrocentesis at this time.  Discussed possibility of pain, infection, dry tap.  He expressed understanding and is agreeable [DN]   1026 Arthrocentesis completed sterilely, 10 cc of slightly cloudy straw-colored fluid obtained, sent for fluid studies, no complications [DN]   1027 Will give IV steroids and IV morphine for pain control as patient reports significant pain at this time [DN]   1211 Nucleated Cells, Fluid: 13,550  Consistent with inflammatory arthropathy [DN]   1212 Body Fluid Culture - Body Fluid, Knee, Right  Pending [DN]   1232 Crystals, Fluid: Intra and Extracellular crystals observed exhibiting polarization characteristics of Uric Acid [DN]   1232 I discussed results with patient and family at bedside.  Apparent gout flare, Ortho follow-up for persistent symptoms, steroid prescription, they expressed understanding and are agreeable with plan for discharge home at this time [DN]      ED Course User Index  [DN] Paulo Gonsalez MD             AS OF 12:37 EDT VITALS:    BP - 152/85  HR - 74  TEMP - 97.7 °F (36.5 °C)  O2 SATS - 98%    COMPLEXITY OF CARE  Admission was considered but after careful review of the patient's presentation, physical examination, diagnostic results, and response to treatment the patient may be safely discharged with outpatient follow-up.      DIAGNOSIS  Final diagnoses:   Acute idiopathic gout of right knee         DISPOSITION  ED Disposition       ED Disposition   Discharge    Condition   Stable    Comment   --               New Medications Ordered This Visit   Medications    lidocaine (XYLOCAINE) 1 % injection 10 mL    morphine injection 4 mg    dexAMETHasone (DECADRON) injection 10 mg    ondansetron (ZOFRAN) injection 4 mg    HYDROcodone-acetaminophen (NORCO) 5-325 MG per tablet     Sig: Take 1 tablet by mouth Every 6 (Six) Hours As Needed for Moderate Pain.     Dispense:  12 tablet     Refill:  0    predniSONE  (DELTASONE) 20 MG tablet     Sig: Take 2 tablets by mouth Daily for 7 days.     Dispense:  14 tablet     Refill:  0    naloxone (NARCAN) 4 MG/0.1ML nasal spray     Sig: Call 911. Don't prime. [place] in 1 nostril for overdose. Repeat in 2-3 minutes in other nostril if no or minimal breathing/responsiveness.     Dispense:  2 each     Refill:  0       Please note that portions of this document were completed with a voice recognition program.    Note Disclaimer: At [facility], we believe that sharing information builds trust and better relationships. You are receiving this note because you recently visited [facility]. It is possible you will see health information before a provider has talked with you about it. This kind of information can be easy to misunderstand. To help you fully understand what it means for your health, we urge you to discuss this note with your provider.         Paulo Gonsalez MD  04/14/25 0900       Paulo Gonsalez MD  04/14/25 1139       Paulo Gonsalez MD  04/14/25 123

## 2025-04-14 NOTE — ED NOTES
Patient to ER via car from home for R knee pain x 1 week    Was seen in [facility] for same

## 2025-04-19 LAB
BACTERIA FLD CULT: NORMAL
GRAM STN SPEC: NORMAL
GRAM STN SPEC: NORMAL

## 2025-07-23 ENCOUNTER — TELEPHONE (OUTPATIENT)
Dept: GASTROENTEROLOGY | Facility: CLINIC | Age: 60
End: 2025-07-23
Payer: MEDICAID

## 2025-07-23 NOTE — TELEPHONE ENCOUNTER
Last colonoscopy 8/29/19    Personal hx polyps  No family hx polyps or cx    Asa or blood thinners:  None    List medications:  Carvedilol  Trazodone  Valsartan  Rosuvastatin  Doxazosin  Loratadine  Vitamin A  Metamucil  Melatonin    OA form scanned in media

## 2025-07-24 DIAGNOSIS — K63.5 POLYP OF COLON, UNSPECIFIED PART OF COLON, UNSPECIFIED TYPE: Primary | ICD-10-CM

## 2025-07-25 ENCOUNTER — TELEPHONE (OUTPATIENT)
Dept: GASTROENTEROLOGY | Facility: CLINIC | Age: 60
End: 2025-07-25
Payer: MEDICAID

## 2025-07-25 NOTE — TELEPHONE ENCOUNTER
IRENE ESTEBAN IN SCHEDULING PT SCHEDULED 09/05/2025 ARRIVING AT 0700AM CLS PREP INFORMATION MAILED TO ADDRESS ON FILE VERIFIED BY PT INTERPRETED THROUGH  [employer] [id number] VERBALIZES PT CAN READ ENGLISH CLS PREP INFORMATION INTERPRETED AT HOME OK FOR THE HUB TO RELAY    
Unknown

## 2025-07-25 NOTE — TELEPHONE ENCOUNTER
IRENE ESTEBAN IN SCHEDULING PT SCHEDULED 09/05/2025 ARRIVING AT 0700AM CLS PREP INFORMATION MAILED TO ADDRESS ON FILE VERIFIED BY PT INTERPRETED THROUGH  [employer] [id number] VERBALIZES PT CAN READ ENGLISH CLS PREP INFORMATION INTERPRETED AT HOME OK FOR THE HUB TO RELAY

## 2025-07-25 NOTE — TELEPHONE ENCOUNTER
IRENE ESTEBAN IN SCHEDULING PT SCHEDULED 09/05/2025 ARRIVING AT 0700AM CLS PREP INFORMATION MAILED TO ADDRESS ON FILE VERIFIED BY PT INTERPRETED THROUGH  [employer] [id number] VERBALIZES PT CAN READ ENGLISH CLS PREP INFORMATION INTERPRETED AT HOME OK FOR THE HUB TO RELAY

## 2025-08-29 ENCOUNTER — TELEPHONE (OUTPATIENT)
Dept: GASTROENTEROLOGY | Facility: CLINIC | Age: 60
End: 2025-08-29
Payer: MEDICAID